# Patient Record
Sex: FEMALE
[De-identification: names, ages, dates, MRNs, and addresses within clinical notes are randomized per-mention and may not be internally consistent; named-entity substitution may affect disease eponyms.]

---

## 2021-05-13 ENCOUNTER — RX ONLY (OUTPATIENT)
Age: 36
Setting detail: RX ONLY
End: 2021-05-13

## 2021-05-21 ENCOUNTER — APPOINTMENT (RX ONLY)
Dept: URBAN - METROPOLITAN AREA CLINIC 55 | Facility: CLINIC | Age: 36
Setting detail: DERMATOLOGY
End: 2021-05-21

## 2021-05-21 DIAGNOSIS — Z41.9 ENCOUNTER FOR PROCEDURE FOR PURPOSES OTHER THAN REMEDYING HEALTH STATE, UNSPECIFIED: ICD-10-CM

## 2021-05-21 PROCEDURE — ? SCULPTRA

## 2021-05-21 PROCEDURE — ? FILLERS

## 2021-05-21 PROCEDURE — ? DYSPORT

## 2021-05-21 NOTE — PROCEDURE: FILLERS
Temple Hollows Filler Volume In Cc: 0
Filler: Rogelio Peña
Map Statment: See Attach Map for Complete Details
Expiration Date (Month Year): 11/30/2023
Include Cannula Information In Note?: No
Lot #: [id number]
Detail Level: Simple
Consent: Written consent was obtained.
Filler Comments: Hands
Include Cannula Size?: 27G
Include Cannula Information In Note?: Yes
Expiration Date (Month Year): 10/31/2023
Lot #: [id number]
Anesthesia Volume In Cc: 0.5
Anesthesia Type: 1% lidocaine with epinephrine
Post-Care Instructions: After care instructions were provided to the patient.

## 2021-05-21 NOTE — PROCEDURE: SCULPTRA
Show Right And Left Temple Volume?: No
Show Cheeks Volume?: Yes
Additional Anesthesia Volume In Cc: 6
Right Temple Hollow Filler Volume In Cc: 0
Consent: Written consent was obtained.
Volumizer: Sculptra
Lot #: [id number]
Post-Care Instructions: After care instructions were provided to the patient.
Dilution Method: The Sculptra was reconstituted with 8cc sterile water and 2cc 2% plain lidocaine for each vial.
Anesthesia Type: 1% lidocaine with epinephrine
Map Statement: See Attached Map for Complete Details.
Vials Reconstituted (Required For Inventory): 1
Injection Technique: The Sculptra was injected to the areas shown in black with a 25g cannula after prepping the skin with alcohol and/or chlorhexidine and providing appropriate anesthesia.
Detail Level: Simple
Expiration Date (Month Year): 9/30/2023
Anesthesia Volume In Cc: 0.5

## 2021-05-21 NOTE — PROCEDURE: DYSPORT
Show Forehead Units: Yes
Show Lcl Units: No
Right Pupillary Line Units: 0
Additional Area 2 Units: 5
Dilution (U/0.1 Cc): 10
Detail Level: Simple
Lot #: [id number]
Glabellar Complex Units: 40
Forehead Units: 25
Additional Area 1 Location: upper lip
Additional Area 3 Units: 2.5
Expiration Date (Month Year): 1/31/2022
Post-Care Instructions: After care instructions were provided to the patient.
Additional Area 3 Location: dli
Periorbital Skin Units: 45
Depressor Anguli Oris Units: 15
Consent: Written consent was obtained.
Additional Area 4 Location: chin
Additional Area 2 Location: lower lip

## 2021-06-10 ENCOUNTER — RX ONLY (OUTPATIENT)
Age: 36
Setting detail: RX ONLY
End: 2021-06-10

## 2021-06-10 ENCOUNTER — APPOINTMENT (RX ONLY)
Dept: URBAN - METROPOLITAN AREA CLINIC 55 | Facility: CLINIC | Age: 36
Setting detail: DERMATOLOGY
End: 2021-06-10

## 2021-06-10 DIAGNOSIS — Z41.9 ENCOUNTER FOR PROCEDURE FOR PURPOSES OTHER THAN REMEDYING HEALTH STATE, UNSPECIFIED: ICD-10-CM

## 2021-06-10 PROCEDURE — ? COSMETIC CONSULTATION: LASER RESURFACING

## 2021-06-10 PROCEDURE — ? COSMETIC CONSULTATION: SKIN TIGHTENING

## 2021-06-10 RX ORDER — VALACYCLOVIR 500 MG/1
TABLET, FILM COATED ORAL
Qty: 10 | Refills: 0 | Status: ERX | COMMUNITY
Start: 2021-06-10

## 2021-07-09 ENCOUNTER — APPOINTMENT (RX ONLY)
Dept: URBAN - METROPOLITAN AREA CLINIC 55 | Facility: CLINIC | Age: 36
Setting detail: DERMATOLOGY
End: 2021-07-09

## 2021-07-09 DIAGNOSIS — Z41.9 ENCOUNTER FOR PROCEDURE FOR PURPOSES OTHER THAN REMEDYING HEALTH STATE, UNSPECIFIED: ICD-10-CM

## 2021-07-09 PROCEDURE — ? SCULPTRA

## 2021-07-09 PROCEDURE — ? DYSPORT

## 2021-07-09 PROCEDURE — ? FILLERS

## 2021-07-09 NOTE — PROCEDURE: SCULPTRA
Cheeks Filler Volume In Cc: 0
Show Temples Volume?: Yes
Show Right And Left Jawline Volume?: No
Dilution Method: The Sculptra was reconstituted with 8cc sterile water and 2cc 2% plain lidocaine for each vial.
Injection Technique: The Sculptra was injected to the areas shown in black with a 25g cannula after prepping the skin with alcohol and/or chlorhexidine and providing appropriate anesthesia.
Detail Level: Simple
Consent: Written consent was obtained.
Lot #: [id number]
Map Statement: See Attached Map for Complete Details.
Additional Anesthesia Volume In Cc: 6
Anesthesia Volume In Cc: 0.5
Post-Care Instructions: After care instructions were provided to the patient.
Expiration Date (Month Year): 11/2023
Volumizer: Sculptra
Anesthesia Type: 1% lidocaine with epinephrine
Vials Reconstituted (Required For Inventory): 1

## 2021-07-09 NOTE — PROCEDURE: FILLERS
Additional Area 1 Volume In Cc: 0
Include Cannula Information In Note?: No
Filler: Rogelio Peña
Expiration Date (Month Year): 01/2024
Include Cannula Size?: 27G
Detail Level: Simple
Lot #: [id number]
Map Statment: See Attach Map for Complete Details
Anesthesia Type: 1% lidocaine with epinephrine
Post-Care Instructions: After care instructions were provided to the patient.
Anesthesia Volume In Cc: 0.5
Include Cannula Information In Note?: Yes
Consent: Written consent was obtained.

## 2021-09-09 ENCOUNTER — APPOINTMENT (RX ONLY)
Dept: URBAN - METROPOLITAN AREA CLINIC 55 | Facility: CLINIC | Age: 36
Setting detail: DERMATOLOGY
End: 2021-09-09

## 2021-09-09 DIAGNOSIS — Z41.9 ENCOUNTER FOR PROCEDURE FOR PURPOSES OTHER THAN REMEDYING HEALTH STATE, UNSPECIFIED: ICD-10-CM

## 2021-09-09 PROCEDURE — ? DYSPORT

## 2021-09-09 PROCEDURE — ? SCULPTRA

## 2021-09-09 PROCEDURE — ? THERMAGE

## 2021-09-09 PROCEDURE — ? COSMETIC CONSULTATION: THREAD LIFT

## 2021-09-09 ASSESSMENT — LOCATION ZONE DERM
LOCATION ZONE: ARM
LOCATION ZONE: TRUNK

## 2021-09-09 ASSESSMENT — LOCATION SIMPLE DESCRIPTION DERM
LOCATION SIMPLE: LEFT UPPER ARM
LOCATION SIMPLE: RIGHT UPPER ARM
LOCATION SIMPLE: ABDOMEN

## 2021-09-09 ASSESSMENT — LOCATION DETAILED DESCRIPTION DERM
LOCATION DETAILED: LEFT ANTERIOR PROXIMAL UPPER ARM
LOCATION DETAILED: PERIUMBILICAL SKIN
LOCATION DETAILED: RIGHT ANTERIOR PROXIMAL UPPER ARM

## 2021-09-09 NOTE — PROCEDURE: DYSPORT
Additional Area 3 Location: chin
Show Levator Superior Units: Yes
Additional Area 1 Units: 5
R Brow Units: 0
Show Right And Left Pupillary Line Units: No
Dilution (U/0.1 Cc): 10
Glabellar Complex Units: 30
Post-Care Instructions: After care instructions were provided to the patient.
Additional Area 2 Location: lower lip
Expiration Date (Month Year): 05/31/22
Detail Level: Simple
Lot #: [id number]
Additional Area 1 Location: upper lip
Periorbital Skin Units: 40
Depressor Anguli Oris Units: 15
Consent: Written consent was obtained.
Forehead Units: 7.5

## 2021-09-09 NOTE — PROCEDURE: THERMAGE
Maximum Treatment Settin
Immediate Post-Procedure Findings: mild erythem, mild edema
Minimum Treatment Settin
Consent: Written consent obtained, risks reviewed including but not limited to crusting, scabbing, blistering, scarring, darker or lighter pigmentary change, and/or incomplete removal.
Number Of Vectors: 3
Pre-Procedures Photographs: Yes
Treatment Number: 1
Thermage Tip: Face Tip 3.0
Patient Discomfort: mild
Treatment Endpoint: appropriate recontouring
Post-Procedures Photographs: No
Post-Care Instructions: I reviewed with the patient in detail post-care instructions. Patient should stay away from the sun and wear sun protection until treated areas are fully healed.
Detail Level: Zone
Post-Procedure Text: Sunscreen and ice applied. Post care reviewed with patient.
Repetitions: 1200

## 2021-09-09 NOTE — PROCEDURE: SCULPTRA
Use Map Statement For Sites (Optional): No
Jawline Sculptra Filler Volume In Cc: 0
Show Cheeks Volume?: Yes
Additional Anesthesia Volume In Cc: 6
Volumizer: Sculptra
Injection Technique: The Sculptra was injected to the areas shown in black with a 25g cannula after prepping the skin with alcohol and/or chlorhexidine and providing appropriate anesthesia.
Dilution Method: The Sculptra was reconstituted with 8cc sterile water and 2cc 2% plain lidocaine for each vial.
Post-Care Instructions: After care instructions were provided to the patient.
Anesthesia Volume In Cc: 0.5
Anesthesia Type: 1% lidocaine with epinephrine
Map Statement: See Attached Map for Complete Details.
Consent: Written consent was obtained.
Expiration Date (Month Year): 11/30/23
Lot #: [id number]
Vials Reconstituted (Required For Inventory): 1
Detail Level: Simple

## 2021-10-21 ENCOUNTER — APPOINTMENT (RX ONLY)
Dept: URBAN - METROPOLITAN AREA CLINIC 55 | Facility: CLINIC | Age: 36
Setting detail: DERMATOLOGY
End: 2021-10-21

## 2021-10-21 DIAGNOSIS — Z41.9 ENCOUNTER FOR PROCEDURE FOR PURPOSES OTHER THAN REMEDYING HEALTH STATE, UNSPECIFIED: ICD-10-CM

## 2021-10-21 PROCEDURE — ? INSTALIFT THREADING

## 2021-10-21 PROCEDURE — ? FILLERS

## 2021-10-21 PROCEDURE — ? DYSPORT

## 2021-10-21 PROCEDURE — ? SCULPTRA

## 2021-10-21 NOTE — PROCEDURE: INSTALIFT THREADING
Postcare Statement Text: There were no complications. The patient was given post care instructions and cold packs.
Number Of Threads: 4
Total Anesthesia Volume In Cc (Optional): 0
Pre-Procedure Text: The treatment areas were prepped with alcohol and chlorhexidine. Insertion and exit points were mapped out with the Silhouette ruler and marked with a surgical marker.
Anesthesia Method: local infiltration
Anesthesia Type: 1% lidocaine with epinephrine
Post-Care Instructions (For The Patient Hand-Out): I reviewed with the patient in detail post-care instructions. Patient should apply ice packs multiple times a day for a couple days. They were instructed to call if they have any problems.
Procedure Text: An 18 gauge needle was used to create the insertions points and the needles with absorbable sutures were inserted subcutaneously in each direction and advanced through to the exit points on either side. The threads were then tightened and cut adjacent to the exit points and allowed to retract into the subcutaneous space.
Detail Level: Zone
Consent: The risks and benefits of the procedure were discussed with the patient.  Specifically the risks of swelling, bruising, bleeding, discomfort, infection, damage to nerves, numbness, allergic reactions, pigment changes, partial correction, rippling or dimpling, asymmetry, redness, bumps or nodules, visibility of threads, and scarring were reviewed. After this, consent was obtained.

## 2021-10-21 NOTE — PROCEDURE: DYSPORT
Lot #: [id number]
Show Masseter Units: Yes
Additional Area 5 Units: 0
Expiration Date (Month Year): 06/22
Show Ucl Units: No
Consent: Written consent was obtained.
Additional Area 1 Units: 5
Post-Care Instructions: After care instructions were provided to the patient.
Additional Area 1 Location: upper lip
Detail Level: Simple
Additional Area 2 Location: lower lip
Dilution (U/0.1 Cc): 10

## 2021-10-21 NOTE — PROCEDURE: FILLERS
Additional Area 2 Volume In Cc: 0
Anesthesia Volume In Cc: 0.5
Post-Care Instructions: After care instructions were provided to the patient.
Filler: Rogelio Peña
Map Statment: See Attach Map for Complete Details
Detail Level: Simple
Include Cannula Information In Note?: No
Expiration Date (Month Year): 03/24
Include Cannula Size?: 27G
Include Cannula Information In Note?: Yes
Consent: Written consent was obtained.
Lot #: [id number]
Anesthesia Type: 1% lidocaine with epinephrine

## 2021-10-21 NOTE — PROCEDURE: SCULPTRA
Right Middle Malar Filler Volume In Cc: 0
Expiration Date (Month Year): 1/24
Show Mental Crease Volume?: Yes
Show Right And Left Mmc Volume?: No
Consent: Written consent was obtained.
Post-Care Instructions: After care instructions were provided to the patient.
Anesthesia Volume In Cc: 0.5
Injection Technique: The Sculptra was injected to the areas shown in green with a 25g cannula after prepping the skin with alcohol and/or chlorhexidine and providing appropriate anesthesia.
Lot #: [id number]
Detail Level: Simple
Additional Anesthesia Volume In Cc: 6
Anesthesia Type: 1% lidocaine with epinephrine
Map Statement: See Attached Map for Complete Details.
Volumizer: Sculptra
Vials Reconstituted (Required For Inventory): 1
Dilution Method: The Sculptra was reconstituted with 8cc sterile water and 2cc 2% plain lidocaine for each vial.

## 2022-01-11 ENCOUNTER — APPOINTMENT (RX ONLY)
Dept: URBAN - METROPOLITAN AREA CLINIC 55 | Facility: CLINIC | Age: 37
Setting detail: DERMATOLOGY
End: 2022-01-11

## 2022-01-11 DIAGNOSIS — Z41.9 ENCOUNTER FOR PROCEDURE FOR PURPOSES OTHER THAN REMEDYING HEALTH STATE, UNSPECIFIED: ICD-10-CM

## 2022-01-11 PROCEDURE — ? COSMETIC CONSULTATION: FILLERS

## 2022-01-11 PROCEDURE — ? SCULPTRA

## 2022-01-11 NOTE — PROCEDURE: SCULPTRA
Right Dorsal Hand Filler Volume In Cc: 0
Show Right And Left Dorsal Hands Volume?: No
Show Cheeks Volume?: Yes
Lot #: [id number]
Anesthesia Volume In Cc: 0.2
Anesthesia Type: 1% lidocaine with epinephrine
Post-Care Instructions: Patient instructed to apply ice to reduce swelling and to massage the treatment areas 5 times a day for 5 minutes for 5 day.
Additional Anesthesia Volume In Cc: 6
Vials Reconstituted (Required For Inventory): 1
Price (Use Numbers Only, No Special Characters Or $): 700.00
Dilution Method: The Sculptra was reconstituted with 8 ml of sterile water and 2cc 2% plain lidocaine for a total volume of 10ccs for each vial.
Injection Technique: The Sculptra was injected with a 25g cannula to the listed areas after cleansing the skin and providing appropriate anesthesia.
Expiration Date (Month Year): 2/29/2024
Consent: Written consent obtained. Risks include but not limited to bruising, beading, irregular texture, ulceration, infection, allergic reaction, scar formation, incomplete augmentation, temporary nature, procedural pain.
Map Statement: See Attached Map for Complete Details.
Volumizer: Sculptra
Detail Level: Detailed

## 2022-03-28 ENCOUNTER — RX ONLY (OUTPATIENT)
Age: 37
Setting detail: RX ONLY
End: 2022-03-28

## 2022-04-05 ENCOUNTER — APPOINTMENT (RX ONLY)
Dept: URBAN - METROPOLITAN AREA CLINIC 55 | Facility: CLINIC | Age: 37
Setting detail: DERMATOLOGY
End: 2022-04-05

## 2022-04-05 DIAGNOSIS — Z41.9 ENCOUNTER FOR PROCEDURE FOR PURPOSES OTHER THAN REMEDYING HEALTH STATE, UNSPECIFIED: ICD-10-CM

## 2022-04-05 PROCEDURE — ? SCULPTRA

## 2022-04-05 PROCEDURE — ? FILLERS

## 2022-04-05 NOTE — PROCEDURE: FILLERS
Additional Area 1 Volume In Cc: 0
Include Cannula Information In Note?: No
Lot #: [id number]
Lot #: [id number]
Consent: Written consent obtained. Risks include but not limited to bruising, beading, irregular texture, ulceration, infection, allergic reaction, scar formation, incomplete augmentation, temporary nature, procedural pain.
Filler: Rogelio Peña
Anesthesia Type: 1% lidocaine with epinephrine
Dorsal Hands Filler Volume In Cc: 1
Lot #: [id number]
Map Statment: See Attach Map for Complete Details
Expiration Date (Month Year): 01/31/24
Anesthesia Volume In Cc: 0.3
Expiration Date (Month Year): 07/31/2024
Additional Anesthesia Volume In Cc: 6
Include Cannula Size?: 27G
Price (Use Numbers Only, No Special Characters Or $): 941
Include Cannula Information In Note?: Yes
Expiration Date (Month Year): 07/31/24
Expiration Date (Month Year): 05/31/23
Expiration Date (Month Year): 01/31/2024
Detail Level: Detailed
Post-Care Instructions: Patient instructed to apply ice to reduce swelling. Post care handout given to patient.

## 2022-04-05 NOTE — PROCEDURE: SCULPTRA
Mental Crease Filler Volume In Cc: 0
Show Forehead Volume?: Yes
Show Right And Left Pa Volume?: No
Expiration Date (Month Year): 02/29/24
Anesthesia Type: 1% lidocaine with epinephrine
Price (Use Numbers Only, No Special Characters Or $): 700.00
Consent: Written consent obtained. Risks include but not limited to bruising, beading, irregular texture, ulceration, infection, allergic reaction, scar formation, incomplete augmentation, temporary nature, procedural pain.
Map Statement: See Attached Map for Complete Details.
Anesthesia Volume In Cc: 0.2
Post-Care Instructions: Patient instructed to apply ice to reduce swelling and to massage the treatment areas 5 times a day for 5 minutes for 5 day.
Detail Level: Detailed
Injection Technique: The Sculptra was injected with a 25g cannula to the listed areas after cleansing the skin and providing appropriate anesthesia.
Volumizer: Sculptra
Additional Anesthesia Volume In Cc: 6
Dilution Method: The Sculptra was reconstituted with 8 ml of sterile water and 2cc 2% plain lidocaine for a total volume of 10ccs for each vial.
Vials Reconstituted (Required For Inventory): 2
Lot #: [id number]

## 2022-05-17 ENCOUNTER — APPOINTMENT (RX ONLY)
Dept: URBAN - METROPOLITAN AREA CLINIC 55 | Facility: CLINIC | Age: 37
Setting detail: DERMATOLOGY
End: 2022-05-17

## 2022-05-17 DIAGNOSIS — Z41.9 ENCOUNTER FOR PROCEDURE FOR PURPOSES OTHER THAN REMEDYING HEALTH STATE, UNSPECIFIED: ICD-10-CM

## 2022-05-17 PROCEDURE — ? SCULPTRA

## 2022-05-17 PROCEDURE — ? FILLERS

## 2022-05-17 NOTE — PROCEDURE: SCULPTRA
Volumizer: Sculptra
Show Nasolabial Folds Volume?: Yes
Show Right And Left Forehead Volume?: No
Map Statement: See Attached Map for Complete Details.
Right Temple Hollow Filler Volume In Cc: 0
Lot #: [id number]
Price (Use Numbers Only, No Special Characters Or $): 749.00
Anesthesia Volume In Cc: 0.2
Anesthesia Type: 1% lidocaine with epinephrine
Detail Level: Detailed
Post-Care Instructions: Patient instructed to apply ice to reduce swelling and to massage the treatment areas 5 times a day for 5 minutes for 5 day.
Expiration Date (Month Year): 02/29/24
Vials Reconstituted (Required For Inventory): 2
Injection Technique: The Sculptra was injected with a 25g cannula to the listed areas after cleansing the skin and providing appropriate anesthesia.
Dilution Method: The Sculptra was reconstituted with 8 ml of sterile water and 2cc 2% plain lidocaine for a total volume of 10ccs for each vial.
Consent: Written consent obtained. Risks include but not limited to bruising, beading, irregular texture, ulceration, infection, allergic reaction, scar formation, incomplete augmentation, temporary nature, procedural pain.
Additional Anesthesia Volume In Cc: 6

## 2022-05-17 NOTE — PROCEDURE: FILLERS
Additional Area 1 Volume In Cc: 0
Anesthesia Volume In Cc: 0.3
Include Cannula Information In Note?: No
Lot #: [id number]
Lot #: [id number]
Expiration Date (Month Year): 07/31/2024
Map Statment: See Attach Map for Complete Details
Include Cannula Size?: 27G
Expiration Date (Month Year): 01/31/23
Lot #: [id number]
Expiration Date (Month Year): 07/31/24
Detail Level: Detailed
Expiration Date (Month Year): 05/31/23
Anesthesia Type: 1% lidocaine with epinephrine
Lot #: [id number]
Price (Use Numbers Only, No Special Characters Or $): 009
Post-Care Instructions: Patient instructed to apply ice to reduce swelling. Post care handout given to patient.
Expiration Date (Month Year): 08/31/23
Include Cannula Information In Note?: Yes
Filler: RHA 3
Consent: Written consent obtained. Risks include but not limited to bruising, beading, irregular texture, ulceration, infection, allergic reaction, scar formation, incomplete augmentation, temporary nature, procedural pain.
Additional Anesthesia Volume In Cc: 6

## 2022-07-07 ENCOUNTER — APPOINTMENT (RX ONLY)
Dept: URBAN - METROPOLITAN AREA CLINIC 55 | Facility: CLINIC | Age: 37
Setting detail: DERMATOLOGY
End: 2022-07-07

## 2022-07-07 DIAGNOSIS — Z41.9 ENCOUNTER FOR PROCEDURE FOR PURPOSES OTHER THAN REMEDYING HEALTH STATE, UNSPECIFIED: ICD-10-CM

## 2022-07-07 PROCEDURE — ? SCULPTRA

## 2022-07-07 PROCEDURE — ? FILLERS

## 2022-07-07 PROCEDURE — ? DYSPORT

## 2022-07-07 NOTE — PROCEDURE: SCULPTRA
Right Lateral Face Filler Volume In Cc: 0
Map Statement: See Attached Map for Complete Details.
Show Right And Left Pa Volume?: No
Show Cheeks Volume?: Yes
Additional Anesthesia Volume In Cc: 6
Lot #: [id number]
Volumizer: Sculptra
Price (Use Numbers Only, No Special Characters Or $): 757.00
Anesthesia Volume In Cc: 0.2
Post-Care Instructions: Patient instructed to apply ice to reduce swelling and to massage the treatment areas 5 times a day for 5 minutes for 5 day.
Vials Reconstituted (Required For Inventory): 2
Injection Technique: The Sculptra was injected with a 25g cannula to the listed areas after cleansing the skin and providing appropriate anesthesia.
Detail Level: Detailed
Anesthesia Type: 1% lidocaine with epinephrine
Expiration Date (Month Year): 02/29/24
Dilution Method: The Sculptra was reconstituted with 8 ml of sterile water and 2cc 2% plain lidocaine for a total volume of 10ccs for each vial.
Consent: Written consent obtained. Risks include but not limited to bruising, beading, irregular texture, ulceration, infection, allergic reaction, scar formation, incomplete augmentation, temporary nature, procedural pain.

## 2022-07-07 NOTE — PROCEDURE: FILLERS
Vermilion Lips Filler Volume In Cc: 0
Include Cannula Information In Note?: No
Expiration Date (Month Year): 05/31/23
Expiration Date (Month Year): 07/31/24
Map Statment: See Attach Map for Complete Details
Filler: Restylane Defyne
Post-Care Instructions: Patient instructed to apply ice to reduce swelling. Post care handout given to patient.
Detail Level: Detailed
Include Cannula Information In Note?: Yes
Include Cannula Size?: 27G
Lot #: [id number]
Lot #: [id number]
Consent: Written consent obtained. Risks include but not limited to bruising, beading, irregular texture, ulceration, infection, allergic reaction, scar formation, incomplete augmentation, temporary nature, procedural pain.
Additional Anesthesia Volume In Cc: 6
Anesthesia Volume In Cc: 0.3
Anesthesia Type: 1% lidocaine with epinephrine
Expiration Date (Month Year): 08/31/24
Aspiration Statement: Aspiration was performed prior to injecting site with filler.
Lot #: [id number]
Expiration Date (Month Year): 07/31/2024
Expiration Date (Month Year): 01/31/23
Lot #: [id number]

## 2022-09-29 ENCOUNTER — APPOINTMENT (RX ONLY)
Dept: URBAN - METROPOLITAN AREA CLINIC 55 | Facility: CLINIC | Age: 37
Setting detail: DERMATOLOGY
End: 2022-09-29

## 2022-09-29 DIAGNOSIS — Z41.9 ENCOUNTER FOR PROCEDURE FOR PURPOSES OTHER THAN REMEDYING HEALTH STATE, UNSPECIFIED: ICD-10-CM

## 2022-09-29 PROCEDURE — ? FILLERS

## 2022-09-29 PROCEDURE — ? INVENTORY

## 2022-09-29 PROCEDURE — ? SCULPTRA

## 2022-09-29 NOTE — PROCEDURE: SCULPTRA
Show Right And Left Forehead Volume?: No
Right Cheek Filler Volume In Cc: 0
Show Fourth Additional Location?: Yes
Volumizer: Sculptra
Anesthesia Type: 1% lidocaine with epinephrine
Vials Reconstituted (Required For Inventory): 2
Map Statement: See Attached Map for Complete Details.
Dilution Method: Sculptra was reconstituted with 8mL of sterile water and 2cc of 2% plain lidocaine for a total volume of 10ccs for each vial.
Consent obtained and risk reviewed.
Injection Technique: The Sculptra was injected to the listed areas after removing makeup, and cleansing the skin with 70% isopropyl alcohol.
Anesthesia Volume In Cc: 0.3
Additional Anesthesia Volume In Cc: 6
Detail Level: Detailed
Post-Care Instructions: Patient instructed to apply ice to reduce swelling and reviewed post procedural massage.
Show Inventory Tab: Show

## 2022-09-29 NOTE — PROCEDURE: FILLERS
Additional Area 5 Volume In Cc: 0
Include Documentation That Aspiration Was Performed Prior To Injecting Filler:: No
Post-Care Instructions: Patient instructed to apply ice to reduce swelling.
Aspiration Statement: Aspiration was performed prior to injecting site with filler.
Anesthesia Type: 1% lidocaine with epinephrine
Anesthesia Volume In Cc: 0.5
Additional Anesthesia Volume In Cc: 6
Detail Level: Detailed
Include Cannula Information In Note?: Yes
Filler: Restylane Contour
Consent obtained and risks reviewed.
Map Statment: See Attach Map for Complete Details

## 2022-11-18 ENCOUNTER — APPOINTMENT (RX ONLY)
Dept: URBAN - METROPOLITAN AREA CLINIC 55 | Facility: CLINIC | Age: 37
Setting detail: DERMATOLOGY
End: 2022-11-18

## 2022-11-18 DIAGNOSIS — Z41.9 ENCOUNTER FOR PROCEDURE FOR PURPOSES OTHER THAN REMEDYING HEALTH STATE, UNSPECIFIED: ICD-10-CM

## 2022-11-18 PROCEDURE — ? INVENTORY

## 2022-11-18 PROCEDURE — ? SCULPTRA

## 2022-11-18 PROCEDURE — ? FILLERS

## 2022-11-18 NOTE — PROCEDURE: SCULPTRA
Show Nasolabial Folds Volume?: Yes
Cheeks Filler Volume In Cc: 0
Anesthesia Volume In Cc: 0.3
Injection Technique: The Sculptra was injected with a 23g cannula to the listed areas after removing makeup, and cleansing the skin with 70% isopropyl alcohol.
Detail Level: Detailed
Show Right And Left Jawline Volume?: No
Consent obtained and risk reviewed.
Additional Anesthesia Volume In Cc: 6
Show Inventory Tab: Show
Volumizer: Sculptra
Post-Care Instructions: Patient instructed to apply ice to reduce swelling and reviewed post procedural massage.
Anesthesia Type: 1% lidocaine with epinephrine
Dilution Method: Sculptra was reconstituted with 8mL of sterile water and 2cc of 2% plain lidocaine for a total volume of 10ccs for each vial.
Vials Reconstituted (Required For Inventory): 2
Map Statement: See Attached Map for Complete Details.

## 2022-11-18 NOTE — PROCEDURE: FILLERS
Dorsal Hands Filler Volume In Cc: 0
Include Cannula Information In Note?: No
Detail Level: Detailed
Additional Anesthesia Volume In Cc: 6
Post-Care Instructions: Patient instructed to apply ice to reduce swelling.
Consent obtained and risks reviewed.
Use Map Statement For Sites (Optional): Yes
Filler: Restylane Contour
Map Statment: See Attach Map for Complete Details
Aspiration Statement: Aspiration was performed prior to injecting site with filler.
Include Cannula Size?: 27G
Anesthesia Type: 1% lidocaine with epinephrine
Anesthesia Volume In Cc: 0.5

## 2023-01-09 ENCOUNTER — APPOINTMENT (RX ONLY)
Dept: URBAN - METROPOLITAN AREA CLINIC 55 | Facility: CLINIC | Age: 38
Setting detail: DERMATOLOGY
End: 2023-01-09

## 2023-01-09 DIAGNOSIS — Z41.9 ENCOUNTER FOR PROCEDURE FOR PURPOSES OTHER THAN REMEDYING HEALTH STATE, UNSPECIFIED: ICD-10-CM

## 2023-01-09 PROCEDURE — ? INVENTORY

## 2023-01-09 PROCEDURE — ? SCULPTRA

## 2023-01-09 PROCEDURE — ? DAXXIFY

## 2023-01-09 PROCEDURE — ? FILLERS

## 2023-01-09 NOTE — PROCEDURE: DAXXIFY
Comments: Make up was removed from treatment area and area was prepper with 70% isopropyl alcohol.
Additional Area 4 Units: 0
Show Additional Area 3: Yes
Forehead Units: 8
Additional Area 1 Location: upper cutaneous lip
Glabellar Complex Units: 12
Show Right And Left Brow Units: No
Detail Level: Detailed
Additional Area 2 Location: chin
Show Inventory Tab: Show
Additional Area 2 Units: 4
Consent: Written consent and verbal obtained. Risk reviewed.
Post-Care Instructions: Patient instructed to not lie down for 4 hours and limit physical activity for 24 hours.

## 2023-01-09 NOTE — PROCEDURE: SCULPTRA
Show Dorsal Hands Volume?: Yes
Right Mmc Filler Volume In Cc: 0
Post-Care Instructions: Patient instructed to apply ice to reduce swelling and reviewed post procedural massage.
Show Right And Left Zygomatic Arches Volume?: No
Volumizer: Sculptra
Anesthesia Type: 1% lidocaine with epinephrine
Map Statement: See Attached Map for Complete Details.
Dilution Method: Sculptra was reconstituted with 8mL of sterile water and 2cc of 2% plain lidocaine for a total volume of 10ccs for each vial.
Vials Reconstituted (Required For Inventory): 1
Anesthesia Volume In Cc: 0.2
Injection Technique: The Sculptra was injected with a 25g cannula to the listed areas after removing makeup, and cleansed with 70% isopropyl alcohol.
Detail Level: Detailed
Consent obtained and risk reviewed.
Show Inventory Tab: Show
Additional Anesthesia Volume In Cc: 6

## 2023-01-09 NOTE — PROCEDURE: FILLERS
Additional Area 2 Volume In Cc: 0
Consent obtained and risks reviewed.
Post-Care Instructions: Patient instructed to apply ice to reduce swelling.
Include Cannula Information In Note?: No
Use Map Statement For Sites (Optional): Yes
Filler: Rogelio Peña
Include Cannula Size?: 30G
Map Statment: See Attach Map for Complete Details
Aspiration Statement: Aspiration was performed prior to injecting site with filler.
Anesthesia Type: 1% lidocaine with epinephrine
Tear Troughs Filler Volume In Cc: 1
Anesthesia Volume In Cc: 0.3
Dorsal Hands Filler Volume In Cc: 2
Include Cannula Size?: 25G
Filler: RHA Redensity
Detail Level: Detailed

## 2023-03-27 ENCOUNTER — APPOINTMENT (RX ONLY)
Dept: URBAN - METROPOLITAN AREA CLINIC 55 | Facility: CLINIC | Age: 38
Setting detail: DERMATOLOGY
End: 2023-03-27

## 2023-03-27 DIAGNOSIS — Z41.9 ENCOUNTER FOR PROCEDURE FOR PURPOSES OTHER THAN REMEDYING HEALTH STATE, UNSPECIFIED: ICD-10-CM

## 2023-03-27 PROCEDURE — ? SCULPTRA

## 2023-03-27 PROCEDURE — ? INVENTORY

## 2023-03-27 PROCEDURE — ? FILLERS

## 2023-03-27 NOTE — PROCEDURE: SCULPTRA
Additional Area 1 Volume In Cc: 0
Show Nasolabial Folds Volume?: Yes
Show Right And Left Lateral Face Volume?: No
Volumizer: Sculptra
Anesthesia Type: 1% lidocaine with epinephrine
Map Statement: See Attached Map for Complete Details.
Vials Reconstituted (Required For Inventory): 1
Injection Technique: The Sculptra was injected with a 25g cannula to the listed areas after removing makeup, and cleansed with 70% isopropyl alcohol.
Dilution Method: Sculptra was reconstituted with 8mL of sterile water and 2cc of 2% plain lidocaine for a total volume of 10ccs for each vial.
Consent obtained and risk reviewed.
Anesthesia Volume In Cc: 0.2
Detail Level: Detailed
Show Inventory Tab: Show
Additional Anesthesia Volume In Cc: 6
Post-Care Instructions: Patient instructed to apply ice to reduce swelling and reviewed post procedural massage.

## 2023-03-27 NOTE — PROCEDURE: FILLERS
Additional Area 3 Volume In Cc: 0
Include Documentation That Aspiration Was Performed Prior To Injecting Filler:: Yes
Aspiration Statement: Aspiration was performed prior to injecting site with filler.
Include Cannula Information In Note?: No
Anesthesia Type: 1% lidocaine with epinephrine
Anesthesia Volume In Cc: 0.3
Include Cannula Size?: 27G
Detail Level: Detailed
Consent obtained and risks reviewed.
Filler: RHA 3
Post-Care Instructions: Patient instructed to apply ice to reduce swelling.
Map Statment: See Attach Map for Complete Details

## 2023-07-05 ENCOUNTER — APPOINTMENT (RX ONLY)
Dept: URBAN - METROPOLITAN AREA CLINIC 55 | Facility: CLINIC | Age: 38
Setting detail: DERMATOLOGY
End: 2023-07-05

## 2023-07-05 DIAGNOSIS — Z41.9 ENCOUNTER FOR PROCEDURE FOR PURPOSES OTHER THAN REMEDYING HEALTH STATE, UNSPECIFIED: ICD-10-CM

## 2023-07-05 PROCEDURE — ? INVENTORY

## 2023-07-05 PROCEDURE — ? DAXXIFY

## 2023-07-05 PROCEDURE — ? DYSPORT

## 2023-07-05 PROCEDURE — ? SCULPTRA

## 2023-07-05 PROCEDURE — ? FILLERS

## 2023-07-05 PROCEDURE — ? IN-HOUSE DISPENSING PHARMACY

## 2023-07-05 NOTE — PROCEDURE: DYSPORT
Additional Area 1 Units: 5
Show Glabellar Units: Yes
Show Mentalis Units: No
Detail Level: Detailed
R Brow Units: 0
Depressor Anguli Oris Units: 15
Additional Area 2 Location: [facility]
Show Inventory Tab: Show
Dilution (U/0.1 Cc): 10
Periorbital Skin Units: 20
Consent obtained and risk reviewed.
Post-Care Instructions: Patient instructed to not lie down flat for 4 hours or rub the treatment area.
Additional Area 1 Location: upper cutaneous

## 2023-07-05 NOTE — PROCEDURE: FILLERS
Map Statment: See Attach Map for Complete Details
Jawline Filler Volume In Cc: 0
Include Cannula Information In Note?: No
Filler: Restylane Contour
Include Documentation That Aspiration Was Performed Prior To Injecting Filler:: Yes
Aspiration Statement: Aspiration was performed prior to injecting site with filler.
Anesthesia Type: 1% lidocaine with epinephrine
Anesthesia Volume In Cc: 0.2
Detail Level: Detailed
Consent obtained and risks reviewed.
Post-Care Instructions: Alastin post injection serum applied to injection sites post procedure. Patient instructed to apply ice to the treatment to reduce swelling.  Discussed to wait 2-3 weeks post injection to assess the outcome prior to  receiving any additional filler services.
Include Cannula Size?: 25G
Include Cannula Length?: 1.5 inch

## 2023-07-05 NOTE — PROCEDURE: DAXXIFY
Additional Area 4 Units: 0
Show Periorbital Units: Yes
Comments: Make up was removed from treatment area and area was prepper with 70% isopropyl alcohol.
Show Ucl Units: No
Consent: Written consent and verbal obtained. Risk reviewed.
Forehead Units: 16
Detail Level: Detailed
Glabellar Complex Units: 12
Post-Care Instructions: Patient instructed to not lie down for 4 hours and limit physical activity for 24 hours.
Additional Area 1 Location: upper cutaneous lip
Additional Area 2 Location: chin
Show Inventory Tab: Show
Additional Area 2 Units: 4
Dilution (U/0.1 Cc): 8

## 2023-07-05 NOTE — PROCEDURE: IN-HOUSE DISPENSING [facility]ARMACY
Product 3 Units Dispensed: 0
Name Of Product 6: Rosacea Triple Combination Gel
Product 3 Application Directions: Apply nightly or as directed. Use SPF daily.
Product 11 Unit Type: mg
Send Charges To Patient Encounter: No
Product 5 Refills: 11
Product 7 Units Dispensed: 1
Product 1 Unit Type: ml
Product 4 Refills: 2
Render Refills If Set To 0: Yes
Product 2 Application Directions: Apply nightly or as directed.
Product 7 Amount/Unit (Numbers Only): 30
Name Of Product 2: Dapsone / Spironolactone Gel
Detail Level: Zone
Product 7 Application Directions: Apply only as directed
Name Of Product 7: Lidocaine 23% / Tetracaine 7% Cream
Name Of Product 1: Anti-Aging Brightening Cream
Name Of Product 5: Hydrating Tretinoin 0.025% Cream
Name Of Product 3: Acne Triple Combination Gel
Name Of Product 4: Hydroquinone 8% Emulsion

## 2023-07-05 NOTE — PROCEDURE: SCULPTRA
Additional Area 3 Volume In Cc: 0
Use Map Statement For Sites (Optional): Yes
Post-Care Instructions: Patient instructed to apply ice to reduce swelling and reviewed post procedural massage.
Anesthesia Volume In Cc: 0.2
Injection Technique: The Sculptra was injected with a 25g cannula to the listed areas after removing makeup, and cleansed with 70% isopropyl alcohol.
Show Right And Left Zygomatic Arches Volume?: No
Consent obtained and risk reviewed.
Map Statement: See Attached Map for Complete Details.
Additional Anesthesia Volume In Cc: 6
Volumizer: Sculptra
Detail Level: Detailed
Show Inventory Tab: Show
Anesthesia Type: 1% lidocaine with epinephrine
Vials Reconstituted (Required For Inventory): 2
Dilution Method: Sculptra was reconstituted with 8mL of sterile water and 2cc of 2% plain lidocaine for a total volume of 10ccs for each vial.

## 2023-08-18 ENCOUNTER — APPOINTMENT (RX ONLY)
Dept: URBAN - METROPOLITAN AREA CLINIC 55 | Facility: CLINIC | Age: 38
Setting detail: DERMATOLOGY
End: 2023-08-18

## 2023-08-18 DIAGNOSIS — Z41.9 ENCOUNTER FOR PROCEDURE FOR PURPOSES OTHER THAN REMEDYING HEALTH STATE, UNSPECIFIED: ICD-10-CM

## 2023-08-18 PROCEDURE — ? INVENTORY

## 2023-08-18 PROCEDURE — ? SCULPTRA

## 2023-08-18 PROCEDURE — ? FILLERS

## 2023-08-18 NOTE — PROCEDURE: SCULPTRA
Forehead Sculptra Filler Volume In Cc: 0
Show Right And Left [facility]mc Volume?: No
Show Chin Volume?: Yes
Dilution Method: 1 vial of Sculptra for the face was reconstituted with 8mL of sterile water and 2cc of 2% plain lidocaine for a total volume of 10ccs.\\nThe second vial of sculptra for the elbows was reconstituted with 8ml of sterile water and 2cc of 2% plain lidocaine for a total volume of 10cc and then an additional 10cc of bacterialstatic saline was added for a total volume of 20cc.
Injection Technique: The Sculptra was injected with a 25g cannula to the listed areas after removing makeup, and cleansed with 70% isopropyl alcohol.
Anesthesia Volume In Cc: 0.2
Detail Level: Detailed
Consent obtained and risk reviewed.
Show Inventory Tab: Show
Additional Anesthesia Volume In Cc: 6
Post-Care Instructions: Patient instructed to apply ice to reduce swelling and reviewed post procedural massage.
Volumizer: Sculptra
Map Statement: See Attached Map for Complete Details.
Anesthesia Type: 1% lidocaine with epinephrine
Vials Reconstituted (Required For Inventory): 2

## 2023-08-18 NOTE — PROCEDURE: FILLERS
Brows Filler Volume In Cc: 0
Aspiration Statement: Aspiration was performed prior to injecting site with filler.
Include Cannula Information In Note?: No
Anesthesia Type: 1% lidocaine with epinephrine
Consent obtained and risks reviewed.
Include Cannula Information In Note?: Yes
Post-Care Instructions: Alastin post injection serum applied to injection sites post procedure. Patient instructed to apply ice to the treatment to reduce swelling.  Discussed to wait 2-3 weeks post injection to assess the outcome prior to  receiving any additional filler services.
Anesthesia Volume In Cc: 0.2
Include Cannula Size?: 25G
Include Cannula Length?: 1.5 inch
Detail Level: Detailed
Filler: Restylane Contour
Map Statment: See Attach Map for Complete Details

## 2023-11-06 ENCOUNTER — APPOINTMENT (RX ONLY)
Dept: URBAN - METROPOLITAN AREA CLINIC 55 | Facility: CLINIC | Age: 38
Setting detail: DERMATOLOGY
End: 2023-11-06

## 2023-11-06 DIAGNOSIS — Z41.9 ENCOUNTER FOR PROCEDURE FOR PURPOSES OTHER THAN REMEDYING HEALTH STATE, UNSPECIFIED: ICD-10-CM

## 2023-11-06 PROCEDURE — ? SCULPTRA

## 2023-11-06 PROCEDURE — ? DAXXIFY

## 2023-11-06 PROCEDURE — ? INVENTORY

## 2023-11-06 PROCEDURE — ? FILLERS

## 2023-11-06 NOTE — PROCEDURE: FILLERS
Mid Face Filler Volume In Cc: 0
Include Cannula Information In Note?: No
Map Statment: See Attach Map for Complete Details
Filler: RHA 3
Include Documentation That Aspiration Was Performed Prior To Injecting Filler:: Yes
Include Cannula Size?: 25G
Aspiration Statement: Aspiration was performed prior to injecting site with filler.
Include Cannula Length?: 1.5 inch
Anesthesia Type: 1% lidocaine with epinephrine
Anesthesia Volume In Cc: 0.2
Consent obtained and risks reviewed.
Detail Level: Detailed
Post-Care Instructions: Alastin post injection serum applied to injection sites post procedure. Patient instructed to apply ice to the treatment to reduce swelling.  Discussed to wait 2-3 weeks post injection to assess the outcome prior to  receiving any additional filler services.
Filler: Rogelio Peña

## 2023-11-06 NOTE — PROCEDURE: SCULPTRA
Additional Anesthesia Volume In Cc: 6
Show Zygomatic Arches Volume?: Yes
Detail Level: Detailed
Left Forehead Filler Volume In Cc: 0
Show Right And Left Buccal Volume?: No
Post-Care Instructions: Patient instructed to apply ice to reduce swelling and reviewed post procedural massage.
Show Inventory Tab: Show
Volumizer: Sculptra
Anesthesia Type: 1% lidocaine with epinephrine
Vials Reconstituted (Required For Inventory): 3
Map Statement: See Attached Map for Complete Details.
Dilution Method: Sculptra that was injected to the cheek hollow and temples was reconstituted with 8mL of sterile water and 2cc of 2% plain lidocaine for a total volume of 10ccs for each vial.\\n\\nSculptra that was injected into the elbow was reconstituted with 8ml of sterile water and 2cc of 2% plain lidocaine then diluted with 10cc of bacterial static saline for a total of 20cc per vial.
Injection Technique: The Sculptra was injected with a 25g cannula to the listed areas after removing makeup, and cleansed with 70% isopropyl alcohol.
Anesthesia Volume In Cc: 0.2
Consent obtained and risk reviewed.

## 2023-11-06 NOTE — PROCEDURE: DAXXIFY
Additional Area 2 Units: 0
Show Periorbital Units: Yes
Show Inventory Tab: Show
Show Ucl Units: No
Consent: Written consent and verbal obtained. Risk reviewed.
Post-Care Instructions: Patient instructed to not lie down for 4 hours and limit physical activity for 24 hours.
Dilution (U/0.1 Cc): 8
Comments: Make up was removed from treatment area and area was prepper with 70% isopropyl alcohol.
Additional Area 1 Location: upper cutaneous lip
Detail Level: Detailed
Additional Area 2 Location: lower lip
Periorbital Skin Units: 16
Glabellar Complex Units: 12

## 2024-01-25 ENCOUNTER — APPOINTMENT (RX ONLY)
Dept: URBAN - METROPOLITAN AREA CLINIC 55 | Facility: CLINIC | Age: 39
Setting detail: DERMATOLOGY
End: 2024-01-25

## 2024-01-25 DIAGNOSIS — Z41.9 ENCOUNTER FOR PROCEDURE FOR PURPOSES OTHER THAN REMEDYING HEALTH STATE, UNSPECIFIED: ICD-10-CM

## 2024-01-25 PROCEDURE — ? COSMETIC CONSULTATION - ULTHERAPY

## 2024-01-25 PROCEDURE — ? SCULPTRA

## 2024-01-25 PROCEDURE — ? INVENTORY

## 2024-01-25 PROCEDURE — ? DYSPORT

## 2024-01-25 PROCEDURE — ? FILLERS

## 2024-01-25 NOTE — PROCEDURE: SCULPTRA
Show Right And Left Nasolabial Fold Volume?: No
Additional Anesthesia Volume In Cc: 6
Show Lateral Face Volume?: Yes
Right Buccal Filler Volume In Cc: 0
Vials Reconstituted (Required For Inventory): 2
Show Inventory Tab: Show
Post-Care Instructions: Patient instructed to apply ice to reduce swelling and reviewed post procedural massage.
Map Statement: See Attached Map for Complete Details.
Anesthesia Type: 1% lidocaine with epinephrine
Anesthesia Volume In Cc: 0.2
Dilution Method: Sculptra was reconstituted with 8mL of sterile water and 2cc of 2% plain lidocaine for a total volume of 10ccs for each vial used for the face.\\nFor the hands, sculptra was reconstituted with 8ml of sterile water and 2cc of 2% plain lido and then additional 10cc of bacteriostatic saline for a total of 20cc, 10cc per hand.
Consent obtained and risk reviewed.
Volumizer: Sculptra
Injection Technique: The Sculptra was injected with a 25g cannula to the listed areas after removing makeup, and cleansed with 70% isopropyl alcohol.
Detail Level: Detailed
Finasteride Pregnancy And Lactation Text: This medication is absolutely contraindicated during pregnancy. It is unknown if it is excreted in breast milk.

## 2024-01-25 NOTE — PROCEDURE: DYSPORT
Show Anterior Platysmal Band Units: Yes
Additional Area 5 Units: 0
Show Right And Left Brow Units: No
Glabellar Complex Units: 30
Additional Area 1 Location: neck
Additional Area 1 Units: 50
Additional Area 2 Location: chin
Additional Area 2 Units: 10
Consent obtained and risk reviewed.
Post-Care Instructions: Patient instructed to not lie down flat for 4 hours or rub the treatment area.
Forehead Units: 40
Detail Level: Detailed
Show Inventory Tab: Show

## 2024-01-25 NOTE — PROCEDURE: FILLERS
Jawline Filler Volume In Cc: 0
Include Cannula Information In Note?: No
Detail Level: Detailed
Filler: RHA Redensity
Use Map Statement For Sites (Optional): Yes
Map Statment: See Attach Map for Complete Details
Consent obtained and risks reviewed.
Aspiration Statement: Aspiration was performed prior to injecting site with filler.
Post-Care Instructions: Alastin post injection serum applied to injection sites post procedure. Patient instructed to apply ice to the treatment to reduce swelling.  Discussed to wait 2-3 weeks post injection to assess the outcome prior to  receiving any additional filler services.
Anesthesia Type: 1% lidocaine with epinephrine
Include Cannula Size?: 27G
Include Cannula Length?: 1.5 inch
Anesthesia Volume In Cc: 0.2

## 2024-05-13 ENCOUNTER — APPOINTMENT (RX ONLY)
Dept: URBAN - METROPOLITAN AREA CLINIC 55 | Facility: CLINIC | Age: 39
Setting detail: DERMATOLOGY
End: 2024-05-13

## 2024-05-13 DIAGNOSIS — Z41.9 ENCOUNTER FOR PROCEDURE FOR PURPOSES OTHER THAN REMEDYING HEALTH STATE, UNSPECIFIED: ICD-10-CM

## 2024-05-13 PROCEDURE — ? DYSPORT

## 2024-05-13 PROCEDURE — ? SCULPTRA

## 2024-05-13 PROCEDURE — ? DAXXIFY

## 2024-05-13 PROCEDURE — ? INVENTORY

## 2024-05-13 NOTE — PROCEDURE: DYSPORT
Show Mentalis Units: No
Mentalis Units: 0
Show Topical Anesthesia: Yes
Detail Level: Detailed
Additional Area 1 Location: upper and lower cutaneous lip
Additional Area 1 Units: 10
Depressor Anguli Oris Units: 15
Show Inventory Tab: Show
Additional Area 2 Location: chin
Consent obtained and risk reviewed.
Post-Care Instructions: Patient instructed to not lie down flat for 4 hours or rub the treatment area.

## 2024-05-13 NOTE — PROCEDURE: DAXXIFY
Show Periorbital Units: Yes
Anterior Platysmal Bands Units: 0
Consent: Written consent and verbal obtained. Risk reviewed.
Show Ucl Units: No
Show Inventory Tab: Show
Comments: Make up was removed from treatment area and area was prepper with 70% isopropyl alcohol.
Post-Care Instructions: Patient instructed to not lie down for 4 hours and limit physical activity for 24 hours.
Additional Area 1 Location: upper cutaneous lip
Additional Area 2 Location: lower lip
Bill Summary Price Listed Below, Or Bill Total Of Units X Price Per Unit?: Bill Summary Price Below
Dilution (U/0.1 Cc): 8
Periorbital Skin Units: 24
Detail Level: Detailed

## 2024-05-13 NOTE — PROCEDURE: SCULPTRA
Show Tear Troughs Volume?: Yes
Dorsal Hands Sculptra Filler Volume In Cc: 0
Dilution Method: Sculptra was reconstituted with 8mL of sterile water and 2cc of 2% plain lidocaine for a total volume of 10ccs for each vial. Vial injected into the hands was diluted with an additional 10cc of sterile water for a total to 20cc.
Show Right And Left Lateral Face Volume?: No
Consent obtained and risk reviewed.
Show Inventory Tab: Show
Anesthesia Volume In Cc: 0.2
Injection Technique: The Sculptra was injected with a 25g cannula to the listed areas after removing makeup, and cleansed with 70% isopropyl alcohol.
Map Statement: See Attached Map for Complete Details.
Additional Anesthesia Volume In Cc: 6
Post-Care Instructions: Patient instructed to apply ice to reduce swelling and reviewed post procedural massage.
Volumizer: Sculptra
Vials Reconstituted (Required For Inventory): 2
Anesthesia Type: 1% lidocaine with epinephrine
Detail Level: Detailed
2

## 2024-09-16 ENCOUNTER — APPOINTMENT (RX ONLY)
Dept: URBAN - METROPOLITAN AREA CLINIC 55 | Facility: CLINIC | Age: 39
Setting detail: DERMATOLOGY
End: 2024-09-16

## 2024-09-16 ENCOUNTER — RX ONLY (OUTPATIENT)
Age: 39
Setting detail: RX ONLY
End: 2024-09-16

## 2024-09-16 DIAGNOSIS — Z41.9 ENCOUNTER FOR PROCEDURE FOR PURPOSES OTHER THAN REMEDYING HEALTH STATE, UNSPECIFIED: ICD-10-CM

## 2024-09-16 PROCEDURE — ? DYSPORT

## 2024-09-16 PROCEDURE — ? FILLERS

## 2024-09-16 PROCEDURE — ? INVENTORY

## 2024-09-16 PROCEDURE — ? DAXXIFY

## 2024-09-16 PROCEDURE — ? SCULPTRA

## 2024-09-16 NOTE — PROCEDURE: FILLERS
Jawline Filler Volume In Cc: 0
Include Cannula Information In Note?: Yes
Aspiration Statement: Aspiration was performed prior to injecting site with filler.
Include Cannula Information In Note?: No
Anesthesia Type: 1% lidocaine with epinephrine
Include Cannula Size?: 25G
Anesthesia Volume In Cc: 0.2
Include Cannula Length?: 1.5 inch
Detail Level: Detailed
Filler: Restylane Defyne
Map Statment: See Attach Map for Complete Details
Consent obtained and risks reviewed.
Post-Care Instructions: Alastin post injection serum applied to injection sites post procedure. Patient instructed to apply ice to the treatment to reduce swelling.  Discussed to wait 2-3 weeks post injection to assess the outcome prior to  receiving any additional filler services.

## 2024-09-16 NOTE — PROCEDURE: DAXXIFY
Right Pupillary Line Units: 0
Show Lcl Units: No
Show Topical Anesthesia: Yes
Post-Care Instructions: Patient instructed to not lie down for 4 hours and limit physical activity for 24 hours.
Bill Summary Price Listed Below, Or Bill Total Of Units X Price Per Unit?: Bill Summary Price Below
Additional Area 2 Location: lower lip
Show Inventory Tab: Show
Periorbital Skin Units: 24
Dilution (U/0.1 Cc): 8
Comments: Make up was removed from treatment area and area was prepper with 70% isopropyl alcohol.
Additional Area 1 Location: upper cutaneous lip
Consent: Written consent and verbal obtained. Risk reviewed.
Detail Level: Detailed

## 2024-09-16 NOTE — PROCEDURE: SCULPTRA
Right Forehead Filler Volume In Cc: 0
Show Right And Left Cheek Volume?: No
Vials Reconstituted (Required For Inventory): 2
Show Third Additional Location?: Yes
Detail Level: Detailed
Anesthesia Type: 1% lidocaine with epinephrine
Anesthesia Volume In Cc: 0.2
Dilution Method: Sculptra was reconstituted with 8mL of sterile water and 2cc of 2% plain lidocaine for a total volume of 10ccs for each vial. \\nSculptra to the hands was then double diluted with bacteriorstatic saline  for a total of 20cc.
Show Inventory Tab: Show
Consent obtained and risk reviewed.
Injection Technique: The Sculptra was injected with a 25g cannula to the listed areas after removing makeup, and cleansed with 70% isopropyl alcohol.
Additional Anesthesia Volume In Cc: 6
Volumizer: Sculptra
Map Statement: See Attached Map for Complete Details.
Post-Care Instructions: Patient instructed to apply ice to reduce swelling and reviewed post procedural massage.

## 2024-09-16 NOTE — PROCEDURE: DYSPORT
Inferior Lateral Orbicularis Oculi Units: 0
Show Anterior Platysmal Band Units: Yes
Show Right And Left Pupillary Line Units: No
Additional Area 1 Location: upper cutaneous
Depressor Anguli Oris Units: 15
Forehead Units: 40
Additional Area 2 Location: chin
Consent obtained and risk reviewed.
Detail Level: Detailed
Post-Care Instructions: Patient instructed to not lie down flat for 4 hours or rub the treatment area.
Dilution (U/0.1 Cc): 10
Show Inventory Tab: Show
Glabellar Complex Units: 30

## 2025-04-08 ENCOUNTER — APPOINTMENT (OUTPATIENT)
Dept: URBAN - METROPOLITAN AREA CLINIC 55 | Facility: CLINIC | Age: 40
Setting detail: DERMATOLOGY
End: 2025-04-08

## 2025-04-08 DIAGNOSIS — Z41.9 ENCOUNTER FOR PROCEDURE FOR PURPOSES OTHER THAN REMEDYING HEALTH STATE, UNSPECIFIED: ICD-10-CM

## 2025-04-08 PROCEDURE — ? DAXXIFY

## 2025-04-08 PROCEDURE — ? DYSPORT

## 2025-04-08 PROCEDURE — ? SCULPTRA

## 2025-04-08 PROCEDURE — ? INVENTORY

## 2025-04-08 NOTE — PROCEDURE: DAXXIFY
Show Lateral Platysmal Band Units: Yes
Right Pupillary Line Units: 0
Show Inventory Tab: Show
Comments: Make up was removed from treatment area and area was prepper with 70% isopropyl alcohol.
Consent: Written consent and verbal obtained. Risk reviewed.
Show Ucl Units: No
Additional Area 1 Location: upper cutaneous lip
Periorbital Skin Units: 24
Post-Care Instructions: Patient instructed to not lie down for 4 hours and limit physical activity for 24 hours.
Additional Area 2 Location: lower lip
Bill Summary Price Listed Below, Or Bill Total Of Units X Price Per Unit?: Bill Summary Price Below
Forehead Units: 40
Detail Level: Detailed
Dilution (U/0.1 Cc): 8

## 2025-04-08 NOTE — PROCEDURE: SCULPTRA
Zygomatic Arches Filler Volume In Cc: 0
Show Right And Left Middle Malar Volume?: No
Use Map Statement For Sites (Optional): Yes
Consent obtained and risk reviewed.
Map Statement: See Attached Map for Complete Details.
Additional Anesthesia Volume In Cc: 6
Post-Care Instructions: Patient instructed to apply ice to reduce swelling and reviewed post procedural massage.
Volumizer: Sculptra
Detail Level: Detailed
Anesthesia Type: 1% lidocaine with epinephrine
Vials Reconstituted (Required For Inventory): 2
Show Inventory Tab: Show
Dilution Method: Sculptra was reconstituted with 8mL of sterile water and 2cc of 2% plain lidocaine for a total volume of 10ccs for each vial.\\nSculptra to the hands was diluted with an additional 10cc of bacteriostatic saline for a total of 20cc.
Injection Technique: The Sculptra was injected with a 25g cannula to the listed areas after removing makeup, and cleansed with 70% isopropyl alcohol.
Anesthesia Volume In Cc: 0.2

## 2025-04-08 NOTE — PROCEDURE: DYSPORT
Inferior Lateral Orbicularis Oculi Units: 0
Show Mentalis Units: No
Show Glabellar Units: Yes
Depressor Anguli Oris Units: 15
Additional Area 2 Location: chin
Show Inventory Tab: Show
Additional Area 2 Units: 10
Anterior Platysmal Bands Units: 50
Consent obtained and risk reviewed.
Post-Care Instructions: Patient instructed to not lie down flat for 4 hours or rub the treatment area.
Additional Area 1 Location: upper lip cutaneous
Detail Level: Detailed
Glabellar Complex Units: 30

## 2025-08-19 ENCOUNTER — APPOINTMENT (OUTPATIENT)
Dept: URBAN - METROPOLITAN AREA CLINIC 55 | Facility: CLINIC | Age: 40
Setting detail: DERMATOLOGY
End: 2025-08-19

## 2025-08-19 ENCOUNTER — RX ONLY (RX ONLY)
Age: 40
End: 2025-08-19

## 2025-08-19 DIAGNOSIS — Z41.9 ENCOUNTER FOR PROCEDURE FOR PURPOSES OTHER THAN REMEDYING HEALTH STATE, UNSPECIFIED: ICD-10-CM

## 2025-08-19 PROCEDURE — ? SCULPTRA

## 2025-08-19 PROCEDURE — ? DAXXIFY

## 2025-08-19 PROCEDURE — ? INVENTORY

## 2025-08-19 PROCEDURE — ? FILLERS

## 2025-08-19 PROCEDURE — ? DYSPORT
